# Patient Record
Sex: FEMALE | URBAN - METROPOLITAN AREA
[De-identification: names, ages, dates, MRNs, and addresses within clinical notes are randomized per-mention and may not be internally consistent; named-entity substitution may affect disease eponyms.]

---

## 2019-05-13 PROBLEM — 275978004 SCREENING FOR MALIGNANT NEOPLASM OF COLON: Status: ACTIVE | Noted: 2019-05-13

## 2019-06-11 PROBLEM — 92040001 BENIGN NEOPLASM OF CECUM: Status: ACTIVE | Noted: 2019-06-11

## 2019-06-11 PROBLEM — 92343006 BENIGN NEOPLASM OF SIGMOID COLON: Status: ACTIVE | Noted: 2019-06-11

## 2019-06-11 PROBLEM — 721704005 SECOND DEGREE HEMORRHOIDS: Status: ACTIVE | Noted: 2019-06-11

## 2020-06-18 ENCOUNTER — LAB OUTSIDE AN ENCOUNTER (OUTPATIENT)
Dept: URBAN - METROPOLITAN AREA CLINIC 37 | Facility: CLINIC | Age: 60
End: 2020-06-18

## 2020-06-18 ENCOUNTER — OFFICE VISIT (OUTPATIENT)
Dept: URBAN - METROPOLITAN AREA CLINIC 37 | Facility: CLINIC | Age: 60
End: 2020-06-18

## 2020-06-18 VITALS — BODY MASS INDEX: 25.13 KG/M2 | HEIGHT: 60 IN | WEIGHT: 128 LBS

## 2020-06-18 PROBLEM — 10995641000119109: Status: ACTIVE | Noted: 2019-05-13

## 2020-06-18 PROBLEM — 59621000: Status: ACTIVE | Noted: 2019-05-13

## 2020-06-18 RX ORDER — PRAVASTATIN SODIUM 20 MG/1
1 TABLET TABLET ORAL QHS
Status: ACTIVE | COMMUNITY

## 2020-06-18 RX ORDER — QUETIAPINE FUMARATE 25 MG/1
1 TABLET AT BEDTIME TABLET ORAL
Status: ACTIVE | COMMUNITY

## 2020-06-18 RX ORDER — MELOXICAM 15 MG/1
1 TABLET TABLET ORAL ONCE A DAY
Qty: 30 | Status: ACTIVE | COMMUNITY

## 2020-06-18 RX ORDER — SUCRALFATE 1 G/1
1 TABLET BEFORE MEALS TABLET ORAL TWICE A DAY
Qty: 60 TABLET | Refills: 1 | OUTPATIENT
Start: 2020-06-18

## 2020-06-18 RX ORDER — ESOMEPRAZOLE MAGNESIUM 40 MG/1
1 CAPSULE CAPSULE, DELAYED RELEASE ORAL ONCE A DAY
Qty: 30 | Status: ACTIVE | COMMUNITY
Start: 2020-06-12

## 2020-06-18 RX ORDER — FOLIC ACID 1 MG/1
1 TABLET TABLET ORAL ONCE A DAY
Qty: 30 | Status: ACTIVE | COMMUNITY

## 2020-06-18 NOTE — HPI-MIGRATED HPI
Acute visit : Patient is here for an acute visit -> Abdominal pain Patient is here due to Abdominal pain  Onset of Sx: Location: epigastric pain, not radiating to other location Severity: burning, reflux , bitter taste in the morning Aggravating factors: unknown Associated Sx: sometimes with loating Relieving factors: none Medications tried: Nexium  from PCP ( Maya, Abrahan) for few months, then she was out of meds  Previous test/evaluation done: had EGD in [place] ( with ulcers found) , had UBT in 2016 ( with negative result)   Current BM: soft BM daily Patient was initially seen 05/2019 for a high risk surveillance colonoscopy due to personal history of sigmoid colon cancer. Patient had colonocopy procedure 05/22/2019, at which time, four polyps were detected and resected. Histology showed they were hyperplastic and tubular adenoma polyps. Advised to repeat in 5 years. She had history of Colon cancer in [place] with partial colectomy and 7 rounds of chemotherapy ;

## 2020-06-25 ENCOUNTER — OFFICE VISIT (OUTPATIENT)
Dept: URBAN - METROPOLITAN AREA CLINIC 35 | Facility: CLINIC | Age: 60
End: 2020-06-25

## 2020-07-01 ENCOUNTER — OFFICE VISIT (OUTPATIENT)
Dept: URBAN - METROPOLITAN AREA SURGERY CENTER 8 | Facility: SURGERY CENTER | Age: 60
End: 2020-07-01

## 2020-07-13 ENCOUNTER — DASHBOARD ENCOUNTERS (OUTPATIENT)
Age: 60
End: 2020-07-13

## 2020-07-15 ENCOUNTER — OFFICE VISIT (OUTPATIENT)
Dept: URBAN - METROPOLITAN AREA CLINIC 37 | Facility: CLINIC | Age: 60
End: 2020-07-15

## 2020-07-15 PROBLEM — 92411005: Status: ACTIVE | Noted: 2020-07-13

## 2020-07-15 PROBLEM — 428283002: Status: ACTIVE | Noted: 2020-06-18

## 2020-07-15 PROBLEM — 79922009: Status: ACTIVE | Noted: 2020-06-18

## 2020-07-15 PROBLEM — 429699009: Status: ACTIVE | Noted: 2020-07-15

## 2020-07-15 RX ORDER — PRAVASTATIN SODIUM 20 MG/1
1 TABLET TABLET ORAL QHS
COMMUNITY

## 2020-07-15 RX ORDER — QUETIAPINE FUMARATE 25 MG/1
1 TABLET AT BEDTIME TABLET ORAL
COMMUNITY

## 2020-07-15 RX ORDER — ESOMEPRAZOLE MAGNESIUM 40 MG/1
1 CAPSULE CAPSULE, DELAYED RELEASE ORAL ONCE A DAY
Qty: 30 | COMMUNITY
Start: 2020-06-12

## 2020-07-15 RX ORDER — FOLIC ACID 1 MG/1
1 TABLET TABLET ORAL ONCE A DAY
Qty: 30 | COMMUNITY

## 2020-07-15 RX ORDER — MELOXICAM 15 MG/1
1 TABLET TABLET ORAL ONCE A DAY
Qty: 30 | COMMUNITY

## 2020-07-15 RX ORDER — SUCRALFATE 1 G/1
1 TABLET BEFORE MEALS TABLET ORAL TWICE A DAY
Qty: 60 TABLET | Refills: 1 | COMMUNITY
Start: 2020-06-18

## 2020-07-15 RX ORDER — ESOMEPRAZOLE MAGNESIUM 40 MG/1
1 CAPSULE CAPSULE, DELAYED RELEASE ORAL
Qty: 90 | Refills: 1 | OUTPATIENT
Start: 2020-07-15

## 2020-07-15 RX ORDER — SUCRALFATE 1 G/1
1 TABLET BEFORE MEALS TABLET ORAL TWICE A DAY
Qty: 60 | Refills: 1 | OUTPATIENT

## 2020-07-15 NOTE — HPI-MIGRATED HPI
Post-op OV : Patient denies -> rectal bleeding, fever, nausea & vomiting since the procedure date;   Post-op OV : Patient -> denies any new changes in his/her health status since last OV;   Post-op OV : Patient reports of current symptoms -> on and off with symptoms;   Post-op OV : After EGD on -> 07/01/2020, the esophagus appeared normal with bxx showing mild reactive changes. The GE junction was irregular. A benign pyloric gland nodule measuring 8 mm was found in the proximal gastric antrum. Mild erythema was noted with gastric bxx showing mild reactive/ regenerative changes. No H. pylori or IM identified. Normal duodenum.  This procedure was done for GI pathology due to epigastric pain.  Patient was advised to continue with Carafate 1 gm PO BID + Nexium after the procedure. Rec: US abdomen and/or HIDA scan if with persistent abdominal pain while on PPI and Carafate;

## 2024-08-08 ENCOUNTER — LAB OUTSIDE AN ENCOUNTER (OUTPATIENT)
Dept: URBAN - METROPOLITAN AREA SURGERY CENTER 13 | Facility: SURGERY CENTER | Age: 64
End: 2024-08-08

## 2024-08-12 ENCOUNTER — OFFICE VISIT (OUTPATIENT)
Dept: URBAN - METROPOLITAN AREA CLINIC 37 | Facility: CLINIC | Age: 64
End: 2024-08-12

## 2024-08-13 ENCOUNTER — CLAIMS CREATED FROM THE CLAIM WINDOW (OUTPATIENT)
Dept: URBAN - METROPOLITAN AREA SURGERY CENTER 13 | Facility: SURGERY CENTER | Age: 64
End: 2024-08-13
Payer: COMMERCIAL

## 2024-08-13 DIAGNOSIS — D12.3 ADENOMA OF TRANSVERSE COLON: ICD-10-CM

## 2024-08-13 DIAGNOSIS — Z12.11 COLON CANCER SCREENING: ICD-10-CM

## 2024-08-13 PROCEDURE — 45380 COLONOSCOPY AND BIOPSY: CPT | Performed by: INTERNAL MEDICINE

## 2024-08-13 PROCEDURE — 00812 ANES LWR INTST SCR COLSC: CPT | Performed by: ANESTHESIOLOGY

## 2024-08-13 PROCEDURE — 00812 ANES LWR INTST SCR COLSC: CPT | Performed by: ANESTHESIOLOGIST ASSISTANT

## 2024-08-13 RX ORDER — QUETIAPINE FUMARATE 25 MG/1
1 TABLET AT BEDTIME TABLET ORAL
COMMUNITY

## 2024-08-13 RX ORDER — PRAVASTATIN SODIUM 20 MG/1
1 TABLET TABLET ORAL QHS
COMMUNITY

## 2024-08-13 RX ORDER — FOLIC ACID 1 MG/1
1 TABLET TABLET ORAL ONCE A DAY
Qty: 30 | COMMUNITY

## 2024-08-13 RX ORDER — MELOXICAM 15 MG/1
1 TABLET TABLET ORAL ONCE A DAY
Qty: 30 | COMMUNITY

## 2024-08-13 RX ORDER — ESOMEPRAZOLE MAGNESIUM 40 MG/1
1 CAPSULE CAPSULE, DELAYED RELEASE ORAL
Qty: 90 | Refills: 1 | Status: ACTIVE | COMMUNITY
Start: 2020-07-15

## 2024-10-31 ENCOUNTER — OFFICE VISIT (OUTPATIENT)
Dept: URBAN - METROPOLITAN AREA CLINIC 82 | Facility: CLINIC | Age: 64
End: 2024-10-31

## 2024-10-31 VITALS
DIASTOLIC BLOOD PRESSURE: 76 MMHG | TEMPERATURE: 97.4 F | SYSTOLIC BLOOD PRESSURE: 122 MMHG | HEART RATE: 71 BPM | WEIGHT: 134 LBS | HEIGHT: 60 IN | RESPIRATION RATE: 20 BRPM | BODY MASS INDEX: 26.31 KG/M2

## 2024-10-31 PROBLEM — 235595009: Status: ACTIVE | Noted: 2024-10-31

## 2024-10-31 RX ORDER — GABAPENTIN 300 MG/1
1 CAPSULE CAPSULE ORAL ONCE A DAY
Status: ACTIVE | COMMUNITY

## 2024-10-31 RX ORDER — ROSUVASTATIN CALCIUM 10 MG/1
1 TABLET TABLET, COATED ORAL ONCE A DAY
Status: ACTIVE | COMMUNITY

## 2024-10-31 RX ORDER — AMLODIPINE BESYLATE 5 MG/1
1 TABLET TABLET ORAL ONCE A DAY
Status: ACTIVE | COMMUNITY

## 2024-10-31 RX ORDER — OMEPRAZOLE 20 MG/1
1 CAPSULE 1/2 TO 1 HOUR BEFORE MORNING MEAL CAPSULE, DELAYED RELEASE ORAL ONCE A DAY
Status: ACTIVE | COMMUNITY

## 2024-10-31 RX ORDER — OMEPRAZOLE 40 MG/1
1 CAPSULE 30 MINUTES BEFORE MORNING MEAL CAPSULE, DELAYED RELEASE ORAL TWICE DAILY
Qty: 60 | Refills: 11 | OUTPATIENT
Start: 2024-10-31

## 2024-10-31 RX ORDER — SUCRALFATE 1 G/10ML
10 ML 1 HOUR BEFORE MEALS AND AT BEDTIME ON AN EMPTY STOMACH SUSPENSION ORAL
Status: ACTIVE | COMMUNITY

## 2024-10-31 NOTE — HPI-TODAY'S VISIT:
The patient is a patient with GERD who is here for a follow-up.  She has had GERD for a long time and has been on PPI in the past.  REcently symptoms returned and she still had some reflux at night despite taking prilosec OTC x 2 in the AM.  No NV/no dysphagia.

## 2025-08-15 ENCOUNTER — OFFICE VISIT (OUTPATIENT)
Dept: URBAN - METROPOLITAN AREA CLINIC 42 | Facility: CLINIC | Age: 65
End: 2025-08-15
Payer: COMMERCIAL

## 2025-08-15 DIAGNOSIS — K21.9 GASTROESOPHAGEAL REFLUX DISEASE, UNSPECIFIED WHETHER ESOPHAGITIS PRESENT: ICD-10-CM

## 2025-08-15 PROCEDURE — 99214 OFFICE O/P EST MOD 30 MIN: CPT | Performed by: INTERNAL MEDICINE

## 2025-08-15 RX ORDER — OMEPRAZOLE 40 MG/1
1 CAPSULE 30 MINUTES BEFORE MORNING MEAL CAPSULE, DELAYED RELEASE ORAL TWICE DAILY
Qty: 60 | Refills: 11 | Status: ACTIVE | COMMUNITY
Start: 2024-10-31

## 2025-08-15 RX ORDER — AMLODIPINE BESYLATE 5 MG/1
1 TABLET TABLET ORAL ONCE A DAY
Status: ACTIVE | COMMUNITY

## 2025-08-15 RX ORDER — ROSUVASTATIN CALCIUM 10 MG/1
1 TABLET TABLET, COATED ORAL ONCE A DAY
Status: ACTIVE | COMMUNITY

## 2025-08-15 RX ORDER — CELECOXIB 100 MG/1
1 CAPSULE CAPSULE ORAL ONCE A DAY
Status: ACTIVE | COMMUNITY

## 2025-08-15 RX ORDER — OMEPRAZOLE 40 MG/1
1 CAPSULE 30 MINUTES BEFORE MEAL CAPSULE, DELAYED RELEASE ORAL
Qty: 60 | Refills: 11 | OUTPATIENT
Start: 2025-08-15